# Patient Record
Sex: FEMALE | Race: WHITE | NOT HISPANIC OR LATINO | Employment: OTHER | URBAN - METROPOLITAN AREA
[De-identification: names, ages, dates, MRNs, and addresses within clinical notes are randomized per-mention and may not be internally consistent; named-entity substitution may affect disease eponyms.]

---

## 2019-01-27 ENCOUNTER — APPOINTMENT (OUTPATIENT)
Dept: RADIOLOGY | Facility: MEDICAL CENTER | Age: 64
DRG: 394 | End: 2019-01-27
Attending: EMERGENCY MEDICINE
Payer: COMMERCIAL

## 2019-01-27 ENCOUNTER — HOSPITAL ENCOUNTER (INPATIENT)
Facility: MEDICAL CENTER | Age: 64
LOS: 2 days | DRG: 394 | End: 2019-01-29
Attending: EMERGENCY MEDICINE | Admitting: HOSPITALIST
Payer: COMMERCIAL

## 2019-01-27 DIAGNOSIS — R58 RETROPERITONEAL BLEED: ICD-10-CM

## 2019-01-27 DIAGNOSIS — K68.3 RETROPERITONEAL HEMATOMA: ICD-10-CM

## 2019-01-27 LAB
ALBUMIN SERPL BCP-MCNC: 4.4 G/DL (ref 3.2–4.9)
ALBUMIN/GLOB SERPL: 1.5 G/DL
ALP SERPL-CCNC: 45 U/L (ref 30–99)
ALT SERPL-CCNC: 23 U/L (ref 2–50)
ANION GAP SERPL CALC-SCNC: 5 MMOL/L (ref 0–11.9)
APPEARANCE UR: CLEAR
APPEARANCE UR: CLEAR
APTT PPP: 28.6 SEC (ref 24.7–36)
AST SERPL-CCNC: 18 U/L (ref 12–45)
BASOPHILS # BLD AUTO: 0.7 % (ref 0–1.8)
BASOPHILS # BLD: 0.07 K/UL (ref 0–0.12)
BILIRUB SERPL-MCNC: 0.6 MG/DL (ref 0.1–1.5)
BILIRUB UR QL STRIP.AUTO: NEGATIVE
BUN SERPL-MCNC: 22 MG/DL (ref 8–22)
CALCIUM SERPL-MCNC: 9.2 MG/DL (ref 8.5–10.5)
CFT BLD TEG: 5.8 MIN (ref 5–10)
CHLORIDE SERPL-SCNC: 106 MMOL/L (ref 96–112)
CLOT ANGLE BLD TEG: 59.5 DEGREES (ref 53–72)
CLOT LYSIS 30M P MA LENFR BLD TEG: 1.5 % (ref 0–8)
CO2 SERPL-SCNC: 27 MMOL/L (ref 20–33)
COLOR UR AUTO: YELLOW
COLOR UR: YELLOW
CREAT SERPL-MCNC: 0.63 MG/DL (ref 0.5–1.4)
CT.EXTRINSIC BLD ROTEM: 2.3 MIN (ref 1–3)
EOSINOPHIL # BLD AUTO: 0.13 K/UL (ref 0–0.51)
EOSINOPHIL NFR BLD: 1.4 % (ref 0–6.9)
ERYTHROCYTE [DISTWIDTH] IN BLOOD BY AUTOMATED COUNT: 49.5 FL (ref 35.9–50)
GLOBULIN SER CALC-MCNC: 2.9 G/DL (ref 1.9–3.5)
GLUCOSE SERPL-MCNC: 104 MG/DL (ref 65–99)
GLUCOSE UR QL STRIP.AUTO: NEGATIVE MG/DL
GLUCOSE UR STRIP.AUTO-MCNC: NEGATIVE MG/DL
HCT VFR BLD AUTO: 43.6 % (ref 37–47)
HGB BLD-MCNC: 14.5 G/DL (ref 12–16)
IMM GRANULOCYTES # BLD AUTO: 0.05 K/UL (ref 0–0.11)
IMM GRANULOCYTES NFR BLD AUTO: 0.5 % (ref 0–0.9)
INR PPP: 1.06 (ref 0.87–1.13)
KETONES UR QL STRIP.AUTO: NEGATIVE MG/DL
KETONES UR STRIP.AUTO-MCNC: NEGATIVE MG/DL
LEUKOCYTE ESTERASE UR QL STRIP.AUTO: NEGATIVE
LEUKOCYTE ESTERASE UR QL STRIP.AUTO: NEGATIVE
LIPASE SERPL-CCNC: 22 U/L (ref 11–82)
LYMPHOCYTES # BLD AUTO: 2.27 K/UL (ref 1–4.8)
LYMPHOCYTES NFR BLD: 24 % (ref 22–41)
MCF BLD TEG: 61.7 MM (ref 50–70)
MCH RBC QN AUTO: 31.5 PG (ref 27–33)
MCHC RBC AUTO-ENTMCNC: 33.3 G/DL (ref 33.6–35)
MCV RBC AUTO: 94.8 FL (ref 81.4–97.8)
MICRO URNS: NORMAL
MONOCYTES # BLD AUTO: 0.61 K/UL (ref 0–0.85)
MONOCYTES NFR BLD AUTO: 6.4 % (ref 0–13.4)
NEUTROPHILS # BLD AUTO: 6.34 K/UL (ref 2–7.15)
NEUTROPHILS NFR BLD: 67 % (ref 44–72)
NITRITE UR QL STRIP.AUTO: NEGATIVE
NITRITE UR QL STRIP.AUTO: NEGATIVE
NRBC # BLD AUTO: 0 K/UL
NRBC BLD-RTO: 0 /100 WBC
PA AA BLD-ACNC: 28.4 %
PA ADP BLD-ACNC: 58.9 %
PH UR STRIP.AUTO: 5 [PH]
PH UR STRIP.AUTO: 5.5 [PH]
PLATELET # BLD AUTO: 127 K/UL (ref 164–446)
PMV BLD AUTO: 12.4 FL (ref 9–12.9)
POTASSIUM SERPL-SCNC: 4.1 MMOL/L (ref 3.6–5.5)
PROT SERPL-MCNC: 7.3 G/DL (ref 6–8.2)
PROT UR QL STRIP: NEGATIVE MG/DL
PROT UR QL STRIP: NEGATIVE MG/DL
PROTHROMBIN TIME: 13.9 SEC (ref 12–14.6)
RBC # BLD AUTO: 4.6 M/UL (ref 4.2–5.4)
RBC UR QL AUTO: NEGATIVE
RBC UR QL AUTO: NEGATIVE
SODIUM SERPL-SCNC: 138 MMOL/L (ref 135–145)
SP GR UR STRIP.AUTO: 1.03
SP GR UR: 1.02
TEG ALGORITHM TGALG: NORMAL
UROBILINOGEN UR STRIP.AUTO-MCNC: 0.2 MG/DL
WBC # BLD AUTO: 9.5 K/UL (ref 4.8–10.8)

## 2019-01-27 PROCEDURE — 700102 HCHG RX REV CODE 250 W/ 637 OVERRIDE(OP): Performed by: HOSPITALIST

## 2019-01-27 PROCEDURE — 700101 HCHG RX REV CODE 250: Performed by: HOSPITALIST

## 2019-01-27 PROCEDURE — 99153 MOD SED SAME PHYS/QHP EA: CPT

## 2019-01-27 PROCEDURE — 85384 FIBRINOGEN ACTIVITY: CPT

## 2019-01-27 PROCEDURE — 85347 COAGULATION TIME ACTIVATED: CPT

## 2019-01-27 PROCEDURE — B4141ZZ FLUOROSCOPY OF SUPERIOR MESENTERIC ARTERY USING LOW OSMOLAR CONTRAST: ICD-10-PCS | Performed by: RADIOLOGY

## 2019-01-27 PROCEDURE — B41B1ZZ FLUOROSCOPY OF OTHER INTRA-ABDOMINAL ARTERIES USING LOW OSMOLAR CONTRAST: ICD-10-PCS | Performed by: RADIOLOGY

## 2019-01-27 PROCEDURE — 700111 HCHG RX REV CODE 636 W/ 250 OVERRIDE (IP): Performed by: RADIOLOGY

## 2019-01-27 PROCEDURE — 99223 1ST HOSP IP/OBS HIGH 75: CPT | Performed by: HOSPITALIST

## 2019-01-27 PROCEDURE — 96374 THER/PROPH/DIAG INJ IV PUSH: CPT

## 2019-01-27 PROCEDURE — 94760 N-INVAS EAR/PLS OXIMETRY 1: CPT

## 2019-01-27 PROCEDURE — 75774 ARTERY X-RAY EACH VESSEL: CPT

## 2019-01-27 PROCEDURE — B41C1ZZ FLUOROSCOPY OF PELVIC ARTERIES USING LOW OSMOLAR CONTRAST: ICD-10-PCS | Performed by: RADIOLOGY

## 2019-01-27 PROCEDURE — 74177 CT ABD & PELVIS W/CONTRAST: CPT

## 2019-01-27 PROCEDURE — 700105 HCHG RX REV CODE 258: Performed by: HOSPITALIST

## 2019-01-27 PROCEDURE — B4191ZZ FLUOROSCOPY OF LUMBAR ARTERIES USING LOW OSMOLAR CONTRAST: ICD-10-PCS | Performed by: RADIOLOGY

## 2019-01-27 PROCEDURE — 700111 HCHG RX REV CODE 636 W/ 250 OVERRIDE (IP): Performed by: EMERGENCY MEDICINE

## 2019-01-27 PROCEDURE — 80053 COMPREHEN METABOLIC PANEL: CPT

## 2019-01-27 PROCEDURE — 85610 PROTHROMBIN TIME: CPT

## 2019-01-27 PROCEDURE — 770006 HCHG ROOM/CARE - MED/SURG/GYN SEMI*

## 2019-01-27 PROCEDURE — 81003 URINALYSIS AUTO W/O SCOPE: CPT

## 2019-01-27 PROCEDURE — B41F1ZZ FLUOROSCOPY OF RIGHT LOWER EXTREMITY ARTERIES USING LOW OSMOLAR CONTRAST: ICD-10-PCS | Performed by: RADIOLOGY

## 2019-01-27 PROCEDURE — 96376 TX/PRO/DX INJ SAME DRUG ADON: CPT

## 2019-01-27 PROCEDURE — 85025 COMPLETE CBC W/AUTO DIFF WBC: CPT

## 2019-01-27 PROCEDURE — 700117 HCHG RX CONTRAST REV CODE 255: Performed by: RADIOLOGY

## 2019-01-27 PROCEDURE — 75726 ARTERY X-RAYS ABDOMEN: CPT

## 2019-01-27 PROCEDURE — 700111 HCHG RX REV CODE 636 W/ 250 OVERRIDE (IP): Performed by: HOSPITALIST

## 2019-01-27 PROCEDURE — A9270 NON-COVERED ITEM OR SERVICE: HCPCS | Performed by: HOSPITALIST

## 2019-01-27 PROCEDURE — 81002 URINALYSIS NONAUTO W/O SCOPE: CPT

## 2019-01-27 PROCEDURE — 96375 TX/PRO/DX INJ NEW DRUG ADDON: CPT

## 2019-01-27 PROCEDURE — 85576 BLOOD PLATELET AGGREGATION: CPT | Mod: 91

## 2019-01-27 PROCEDURE — 99285 EMERGENCY DEPT VISIT HI MDM: CPT

## 2019-01-27 PROCEDURE — 700111 HCHG RX REV CODE 636 W/ 250 OVERRIDE (IP)

## 2019-01-27 PROCEDURE — B4101ZZ FLUOROSCOPY OF ABDOMINAL AORTA USING LOW OSMOLAR CONTRAST: ICD-10-PCS | Performed by: RADIOLOGY

## 2019-01-27 PROCEDURE — 85730 THROMBOPLASTIN TIME PARTIAL: CPT

## 2019-01-27 PROCEDURE — 83690 ASSAY OF LIPASE: CPT

## 2019-01-27 PROCEDURE — 700117 HCHG RX CONTRAST REV CODE 255: Performed by: EMERGENCY MEDICINE

## 2019-01-27 RX ORDER — AMOXICILLIN 250 MG
2 CAPSULE ORAL 2 TIMES DAILY
Status: DISCONTINUED | OUTPATIENT
Start: 2019-01-27 | End: 2019-01-29 | Stop reason: HOSPADM

## 2019-01-27 RX ORDER — ONDANSETRON 2 MG/ML
4 INJECTION INTRAMUSCULAR; INTRAVENOUS PRN
Status: DISCONTINUED | OUTPATIENT
Start: 2019-01-27 | End: 2019-01-27 | Stop reason: HOSPADM

## 2019-01-27 RX ORDER — ALBUTEROL SULFATE 90 UG/1
2 AEROSOL, METERED RESPIRATORY (INHALATION) EVERY 6 HOURS PRN
COMMUNITY

## 2019-01-27 RX ORDER — ACETAMINOPHEN 325 MG/1
650 TABLET ORAL EVERY 6 HOURS PRN
Status: DISCONTINUED | OUTPATIENT
Start: 2019-01-27 | End: 2019-01-29 | Stop reason: HOSPADM

## 2019-01-27 RX ORDER — POLYETHYLENE GLYCOL 3350 17 G/17G
1 POWDER, FOR SOLUTION ORAL
Status: DISCONTINUED | OUTPATIENT
Start: 2019-01-27 | End: 2019-01-29 | Stop reason: HOSPADM

## 2019-01-27 RX ORDER — SODIUM CHLORIDE 9 MG/ML
500 INJECTION, SOLUTION INTRAVENOUS
Status: DISCONTINUED | OUTPATIENT
Start: 2019-01-27 | End: 2019-01-27 | Stop reason: HOSPADM

## 2019-01-27 RX ORDER — ALBUTEROL SULFATE 90 UG/1
2 AEROSOL, METERED RESPIRATORY (INHALATION)
Status: DISCONTINUED | OUTPATIENT
Start: 2019-01-27 | End: 2019-01-29 | Stop reason: HOSPADM

## 2019-01-27 RX ORDER — MORPHINE SULFATE 4 MG/ML
4 INJECTION, SOLUTION INTRAMUSCULAR; INTRAVENOUS ONCE
Status: COMPLETED | OUTPATIENT
Start: 2019-01-27 | End: 2019-01-27

## 2019-01-27 RX ORDER — ONDANSETRON 2 MG/ML
4 INJECTION INTRAMUSCULAR; INTRAVENOUS EVERY 4 HOURS PRN
Status: DISCONTINUED | OUTPATIENT
Start: 2019-01-27 | End: 2019-01-29 | Stop reason: HOSPADM

## 2019-01-27 RX ORDER — ONDANSETRON 4 MG/1
4 TABLET, ORALLY DISINTEGRATING ORAL EVERY 4 HOURS PRN
Status: DISCONTINUED | OUTPATIENT
Start: 2019-01-27 | End: 2019-01-29 | Stop reason: HOSPADM

## 2019-01-27 RX ORDER — OXYCODONE HYDROCHLORIDE 5 MG/1
5 TABLET ORAL
Status: DISCONTINUED | OUTPATIENT
Start: 2019-01-27 | End: 2019-01-29 | Stop reason: HOSPADM

## 2019-01-27 RX ORDER — SODIUM CHLORIDE, SODIUM LACTATE, POTASSIUM CHLORIDE, CALCIUM CHLORIDE 600; 310; 30; 20 MG/100ML; MG/100ML; MG/100ML; MG/100ML
INJECTION, SOLUTION INTRAVENOUS CONTINUOUS
Status: DISCONTINUED | OUTPATIENT
Start: 2019-01-27 | End: 2019-01-28

## 2019-01-27 RX ORDER — MORPHINE SULFATE 4 MG/ML
2 INJECTION, SOLUTION INTRAMUSCULAR; INTRAVENOUS
Status: DISCONTINUED | OUTPATIENT
Start: 2019-01-27 | End: 2019-01-29 | Stop reason: HOSPADM

## 2019-01-27 RX ORDER — MIDAZOLAM HYDROCHLORIDE 1 MG/ML
.5-2 INJECTION INTRAMUSCULAR; INTRAVENOUS PRN
Status: DISCONTINUED | OUTPATIENT
Start: 2019-01-27 | End: 2019-01-27 | Stop reason: HOSPADM

## 2019-01-27 RX ORDER — MIDAZOLAM HYDROCHLORIDE 1 MG/ML
INJECTION INTRAMUSCULAR; INTRAVENOUS
Status: COMPLETED
Start: 2019-01-27 | End: 2019-01-27

## 2019-01-27 RX ORDER — LIDOCAINE 50 MG/G
1 PATCH TOPICAL EVERY 24 HOURS
Status: DISCONTINUED | OUTPATIENT
Start: 2019-01-27 | End: 2019-01-29 | Stop reason: HOSPADM

## 2019-01-27 RX ORDER — BUDESONIDE AND FORMOTEROL FUMARATE DIHYDRATE 160; 4.5 UG/1; UG/1
2 AEROSOL RESPIRATORY (INHALATION)
Status: DISCONTINUED | OUTPATIENT
Start: 2019-01-27 | End: 2019-01-29 | Stop reason: HOSPADM

## 2019-01-27 RX ORDER — PROMETHAZINE HYDROCHLORIDE 25 MG/1
12.5-25 TABLET ORAL EVERY 4 HOURS PRN
Status: DISCONTINUED | OUTPATIENT
Start: 2019-01-27 | End: 2019-01-29 | Stop reason: HOSPADM

## 2019-01-27 RX ORDER — PROMETHAZINE HYDROCHLORIDE 12.5 MG/1
12.5-25 SUPPOSITORY RECTAL EVERY 4 HOURS PRN
Status: DISCONTINUED | OUTPATIENT
Start: 2019-01-27 | End: 2019-01-29 | Stop reason: HOSPADM

## 2019-01-27 RX ORDER — BISACODYL 10 MG
10 SUPPOSITORY, RECTAL RECTAL
Status: DISCONTINUED | OUTPATIENT
Start: 2019-01-27 | End: 2019-01-29 | Stop reason: HOSPADM

## 2019-01-27 RX ORDER — GUAIFENESIN/DEXTROMETHORPHAN 100-10MG/5
10 SYRUP ORAL EVERY 6 HOURS PRN
Status: DISCONTINUED | OUTPATIENT
Start: 2019-01-27 | End: 2019-01-29 | Stop reason: HOSPADM

## 2019-01-27 RX ORDER — ONDANSETRON 2 MG/ML
4 INJECTION INTRAMUSCULAR; INTRAVENOUS ONCE
Status: COMPLETED | OUTPATIENT
Start: 2019-01-27 | End: 2019-01-27

## 2019-01-27 RX ORDER — OXYCODONE HYDROCHLORIDE 5 MG/1
2.5 TABLET ORAL
Status: DISCONTINUED | OUTPATIENT
Start: 2019-01-27 | End: 2019-01-29 | Stop reason: HOSPADM

## 2019-01-27 RX ADMIN — FENTANYL CITRATE 50 MCG: 50 INJECTION, SOLUTION INTRAMUSCULAR; INTRAVENOUS at 15:51

## 2019-01-27 RX ADMIN — ALBUTEROL SULFATE 2 PUFF: 90 AEROSOL, METERED RESPIRATORY (INHALATION) at 20:10

## 2019-01-27 RX ADMIN — IOHEXOL 172 ML: 300 INJECTION, SOLUTION INTRAVENOUS at 16:29

## 2019-01-27 RX ADMIN — SENNOSIDES AND DOCUSATE SODIUM 2 TABLET: 8.6; 5 TABLET ORAL at 17:46

## 2019-01-27 RX ADMIN — MORPHINE SULFATE 4 MG: 4 INJECTION INTRAVENOUS at 09:20

## 2019-01-27 RX ADMIN — MORPHINE SULFATE 4 MG: 4 INJECTION INTRAVENOUS at 13:04

## 2019-01-27 RX ADMIN — ONDANSETRON 4 MG: 2 INJECTION INTRAMUSCULAR; INTRAVENOUS at 09:21

## 2019-01-27 RX ADMIN — BUDESONIDE AND FORMOTEROL FUMARATE DIHYDRATE 2 PUFF: 160; 4.5 AEROSOL RESPIRATORY (INHALATION) at 23:47

## 2019-01-27 RX ADMIN — MIDAZOLAM 1 MG: 1 INJECTION INTRAMUSCULAR; INTRAVENOUS at 15:51

## 2019-01-27 RX ADMIN — FENTANYL CITRATE 50 MCG: 50 INJECTION, SOLUTION INTRAMUSCULAR; INTRAVENOUS at 15:54

## 2019-01-27 RX ADMIN — LIDOCAINE 1 PATCH: 50 PATCH TOPICAL at 17:46

## 2019-01-27 RX ADMIN — MORPHINE SULFATE 2 MG: 4 INJECTION INTRAVENOUS at 20:22

## 2019-01-27 RX ADMIN — SODIUM CHLORIDE, POTASSIUM CHLORIDE, SODIUM LACTATE AND CALCIUM CHLORIDE: 600; 310; 30; 20 INJECTION, SOLUTION INTRAVENOUS at 17:47

## 2019-01-27 RX ADMIN — MIDAZOLAM 1 MG: 1 INJECTION INTRAMUSCULAR; INTRAVENOUS at 15:54

## 2019-01-27 RX ADMIN — IOHEXOL 100 ML: 350 INJECTION, SOLUTION INTRAVENOUS at 12:45

## 2019-01-27 ASSESSMENT — COGNITIVE AND FUNCTIONAL STATUS - GENERAL
MOBILITY SCORE: 24
SUGGESTED CMS G CODE MODIFIER DAILY ACTIVITY: CH
DAILY ACTIVITIY SCORE: 24
SUGGESTED CMS G CODE MODIFIER MOBILITY: CH

## 2019-01-27 ASSESSMENT — LIFESTYLE VARIABLES
TOTAL SCORE: 0
HAVE YOU EVER FELT YOU SHOULD CUT DOWN ON YOUR DRINKING: NO
ALCOHOL_USE: YES
AVERAGE NUMBER OF DAYS PER WEEK YOU HAVE A DRINK CONTAINING ALCOHOL: 1
TOTAL SCORE: 0
EVER HAD A DRINK FIRST THING IN THE MORNING TO STEADY YOUR NERVES TO GET RID OF A HANGOVER: NO
EVER_SMOKED: NEVER
EVER FELT BAD OR GUILTY ABOUT YOUR DRINKING: NO
CONSUMPTION TOTAL: NEGATIVE
HOW MANY TIMES IN THE PAST YEAR HAVE YOU HAD 5 OR MORE DRINKS IN A DAY: 0
ON A TYPICAL DAY WHEN YOU DRINK ALCOHOL HOW MANY DRINKS DO YOU HAVE: 5
HAVE PEOPLE ANNOYED YOU BY CRITICIZING YOUR DRINKING: NO
TOTAL SCORE: 0

## 2019-01-27 ASSESSMENT — PATIENT HEALTH QUESTIONNAIRE - PHQ9
1. LITTLE INTEREST OR PLEASURE IN DOING THINGS: NOT AT ALL
2. FEELING DOWN, DEPRESSED, IRRITABLE, OR HOPELESS: NOT AT ALL
SUM OF ALL RESPONSES TO PHQ9 QUESTIONS 1 AND 2: 0

## 2019-01-27 NOTE — ED NOTES
..Pt updated on poc. Provided another warm blanket. Pt has call light in hand and verbalizes understanding of use. Pt nad. Will continue to monitor

## 2019-01-27 NOTE — ED NOTES
.Pt updated on poc. Expresses no needs at this time. Pt has call light in hand and verbalizes understanding of use. Pt nad. Will continue to monitor

## 2019-01-27 NOTE — ED TRIAGE NOTES
Chief Complaint   Patient presents with   • RUQ Pain   • RLQ Pain   Pt to triage in NAD.  Pt reports she is up here from [place], [place] to do some shopping.  Pt denies N/V/D, denies fever/chills.  Pt reports hx of appendectomy.  Pt educated on triage process and instructed to notify triage RN of any change in status.

## 2019-01-27 NOTE — ED NOTES
Med rec complete per pt at bedside   ROSE  Pt finished a ZPak yesterday AM and a Medrol Dosepak this AM for bronchitis

## 2019-01-27 NOTE — H&P
History and Physical    Date: 1/27/2019    PCP: No primary care provider on file.      CC: ABDOMINAL PAIN    HPI: This is a 63 y.o. female who is presenting WITH RETROPERITONEAL HEMORRHAGE SEEN ON CT. POSSIBLE ACTIVE BLEED.    Past Medical History:   Diagnosis Date   • Asthma        Past Surgical History:   Procedure Laterality Date   • APPENDECTOMY     • LUMBAR FUSION ANTERIOR         No current facility-administered medications for this encounter.      Current Outpatient Prescriptions   Medication Sig Dispense Refill   • fluticasone-salmeterol (ADVAIR) 250-50 MCG/DOSE AEROSOL POWDER, BREATH ACTIVATED Inhale 1 Puff by mouth every 12 hours.     • albuterol 108 (90 Base) MCG/ACT Aero Soln inhalation aerosol Inhale 2 Puffs by mouth every 6 hours as needed for Shortness of Breath.          Social History     Social History   • Marital status:      Spouse name: N/A   • Number of children: N/A   • Years of education: N/A     Occupational History   • Not on file.     Social History Main Topics   • Smoking status: Never Smoker   • Smokeless tobacco: Never Used   • Alcohol use Yes      Comment: 1-2 glasses of wine per day    • Drug use: No   • Sexual activity: Not on file     Other Topics Concern   • Not on file     Social History Narrative   • No narrative on file       History reviewed. No pertinent family history.    Allergies:  Asa [aspirin]    Review of Systems:  Negative except for ABDOMINAL PAIN EXACERBATED BY COUGH.     Physical Exam   Constitutional: She appears well-developed.   HENT:   Head: Normocephalic.   Cardiovascular: Normal rate, regular rhythm and normal heart sounds.    Pulmonary/Chest: Effort normal and breath sounds normal.   Abdominal: There is tenderness.   Neurological: She is alert.   Skin: Skin is warm.   Psychiatric: She has a normal mood and affect. Her behavior is normal.       Vital Signs  Blood Pressure: 117/89   Temperature: 36.2 °C (97.2 °F)   Pulse: 85   Respiration: 18   Pulse  "Oximetry: (!) 87 %        Labs:  Recent Labs      01/27/19   0858   WBC  9.5   RBC  4.60   HEMOGLOBIN  14.5   HEMATOCRIT  43.6   MCV  94.8   MCH  31.5   MCHC  33.3*   RDW  49.5   PLATELETCT  127*   MPV  12.4     Recent Labs      01/27/19   0858   SODIUM  138   POTASSIUM  4.1   CHLORIDE  106   CO2  27   GLUCOSE  104*   BUN  22   CREATININE  0.63   CALCIUM  9.2     Recent Labs      01/27/19   1310   APTT  28.6   INR  1.06     Recent Labs      01/27/19   0858  01/27/19   1310   ASTSGOT  18   --    ALTSGPT  23   --    TBILIRUBIN  0.6   --    ALKPHOSPHAT  45   --    GLOBULIN  2.9   --    INR   --   1.06       Radiology:  CT-ABDOMEN-PELVIS WITH   Final Result      Ill-defined soft tissue density in the right lower quadrant retroperitoneal space likely representing hematoma versus mass. There is some curvilinear increased density centrally within the structure likely representing a vessel with blush/active    extravasation. A mass with calcification centrally is also a possibility.      Two right middle lobe pulmonary nodules measuring up to 5 mm are nonspecific and may be postinflammatory or postinfectious though potentially could represent metastatic disease if this truly is a retroperitoneal mass in the right lower quadrant.      This was discussed with Dr. Valdivia at 12:40 PM.      IR-VISCERAL ANGIOGRAM - SELECTIVE    (Results Pending)             Assessment and Plan:This is a 63 y.o. WITH \"SPONTANEOUS\" RIGHT SIDED RETROPERITONEAL HEMORRHAGE HERE FOR ANGIOGRAPHY AND POSSIBLE EMBOLIZATION.                 "

## 2019-01-27 NOTE — ED PROVIDER NOTES
ED Provider Note    Scribed for Rojas Valdivia M.D. by Lianne Garcia. 1/27/2019  8:59 AM    Means of arrival: walk-in  History limited by: none    CHIEF COMPLAINT  Chief Complaint   Patient presents with   • RUQ Pain   • RLQ Pain       HPI  Kaylan Beverly is a 63 y.o. female who presents to the ED complaining of right sided abdominal pain that has been progressively worsening since yesterday evening, 11PM. Patient reports that this pain progressed to the point, that she has been unable to find a comfortable position and is beginning to radiate into her right hip. The pain is constant, exacerbated with cough, or any type of movement. Patient just recently finished a steroid and azithromycin regimen for treatment of bronchitis, with no lasting symptoms from that. Last bowel movement was this morning, and was somewhat soft otherwise normal. No complaints of fevers, chills, vision changes, sore throat, chest pain, cough, shortness of breath, nausea, vomiting, diarrhea, rash, unilateral weakness, numbness, urinary symptoms. Abdominal surgeries include partial hysterectomy, and appendectomy.    REVIEW OF SYSTEMS    CONSTITUTIONAL:  Denies fever, chills  EYES:  Denies vision changes  ENT:  Denies sore throat, nose, or ear pain.  CARDIOVASCULAR:  Denies chest pain  RESPIRATORY:  Denies cough, shortness of breath  GI: positive abdominal pain Denies nausea, vomiting, or diarrhea.  MUSCULOSKELETAL:  Denies weakness  SKIN:  No rash  NEUROLOGIC:  Denies focal weakness, or numbness.  : No complaints of painful urination, hematuria.    PAST MEDICAL HISTORY  Past Medical History:   Diagnosis Date   • Asthma        FAMILY HISTORY  History reviewed. No pertinent family history.    SOCIAL HISTORY   reports that she has never smoked. She has never used smokeless tobacco. She reports that she drinks alcohol. She reports that she does not use drugs.    SURGICAL HISTORY  Past Surgical History:   Procedure Laterality Date   •  "APPENDECTOMY     • LUMBAR FUSION ANTERIOR         CURRENT MEDICATIONS  No current facility-administered medications for this encounter.   No current outpatient prescriptions on file.    ALLERGIES  Allergies   Allergen Reactions   • Asa [Aspirin] Hives       PHYSICAL EXAM  VITAL SIGNS: /89   Pulse 90   Temp 36.2 °C (97.2 °F) (Temporal)   Resp 18   Ht 1.626 m (5' 4\")   Wt 88 kg (194 lb 0.1 oz)   SpO2 95%   BMI 33.30 kg/m²      Constitutional: Patient is awake and alert. No acute respiratory distress. Mild painful distress Well developed, Well nourished, Non-toxic appearance.  HENT: Normocephalic, Atraumatic, Bilateral external ears normal, Oropharynx pink moist with no exudates, Nose patent.  Eyes: Sclera and conjunctiva clear, No discharge.   Neck:  Supple no nuchal rigidity, no thyromegaly or mass. Non-tender  Lymphatic: No supraclavicular  lymph nodes.   Cardiovascular: Heart is regular rate and rhythm no murmur, rub or thrill.   Thorax & Lungs: Chest is symmetrical, with good breath sounds. No wheezing or crackles. No respiratory distress, No chest tenderness.   Abdomen: Soft, exquisite tenderness along right lower and right mid abdominal pain. no hepatosplenomegaly there is no guarding or rebound, No masses, No pulsatile masses.   Skin: Warm, Dry, no petechia, purpura, or rash.  No bruising to the right flank or abdomen  Back: Non tender with palpation, No CVA tenderness.   Extremities: No edema. Non tender.   Musculoskeletal: Good range of motion to upper and lower extremities.  Neurologic: Alert & oriented to person, time, and place.  Psychiatric: Normal affect    LABS  Results for orders placed or performed during the hospital encounter of 01/27/19   CBC WITH DIFFERENTIAL   Result Value Ref Range    WBC 9.5 4.8 - 10.8 K/uL    RBC 4.60 4.20 - 5.40 M/uL    Hemoglobin 14.5 12.0 - 16.0 g/dL    Hematocrit 43.6 37.0 - 47.0 %    MCV 94.8 81.4 - 97.8 fL    MCH 31.5 27.0 - 33.0 pg    MCHC 33.3 (L) 33.6 - " 35.0 g/dL    RDW 49.5 35.9 - 50.0 fL    Platelet Count 127 (L) 164 - 446 K/uL    MPV 12.4 9.0 - 12.9 fL    Neutrophils-Polys 67.00 44.00 - 72.00 %    Lymphocytes 24.00 22.00 - 41.00 %    Monocytes 6.40 0.00 - 13.40 %    Eosinophils 1.40 0.00 - 6.90 %    Basophils 0.70 0.00 - 1.80 %    Immature Granulocytes 0.50 0.00 - 0.90 %    Nucleated RBC 0.00 /100 WBC    Neutrophils (Absolute) 6.34 2.00 - 7.15 K/uL    Lymphs (Absolute) 2.27 1.00 - 4.80 K/uL    Monos (Absolute) 0.61 0.00 - 0.85 K/uL    Eos (Absolute) 0.13 0.00 - 0.51 K/uL    Baso (Absolute) 0.07 0.00 - 0.12 K/uL    Immature Granulocytes (abs) 0.05 0.00 - 0.11 K/uL    NRBC (Absolute) 0.00 K/uL   COMP METABOLIC PANEL   Result Value Ref Range    Sodium 138 135 - 145 mmol/L    Potassium 4.1 3.6 - 5.5 mmol/L    Chloride 106 96 - 112 mmol/L    Co2 27 20 - 33 mmol/L    Anion Gap 5.0 0.0 - 11.9    Glucose 104 (H) 65 - 99 mg/dL    Bun 22 8 - 22 mg/dL    Creatinine 0.63 0.50 - 1.40 mg/dL    Calcium 9.2 8.5 - 10.5 mg/dL    AST(SGOT) 18 12 - 45 U/L    ALT(SGPT) 23 2 - 50 U/L    Alkaline Phosphatase 45 30 - 99 U/L    Total Bilirubin 0.6 0.1 - 1.5 mg/dL    Albumin 4.4 3.2 - 4.9 g/dL    Total Protein 7.3 6.0 - 8.2 g/dL    Globulin 2.9 1.9 - 3.5 g/dL    A-G Ratio 1.5 g/dL   LIPASE   Result Value Ref Range    Lipase 22 11 - 82 U/L   URINALYSIS CULTURE, IF INDICATED   Result Value Ref Range    Color Yellow     Character Clear     Specific Gravity 1.026 <1.035    Ph 5.0 5.0 - 8.0    Glucose Negative Negative mg/dL    Ketones Negative Negative mg/dL    Protein Negative Negative mg/dL    Bilirubin Negative Negative    Urobilinogen, Urine 0.2 Negative    Nitrite Negative Negative    Leukocyte Esterase Negative Negative    Occult Blood Negative Negative    Micro Urine Req see below    ESTIMATED GFR   Result Value Ref Range    GFR If African American >60 >60 mL/min/1.73 m 2    GFR If Non African American >60 >60 mL/min/1.73 m 2   Prothrombin Time   Result Value Ref Range    PT 13.9 12.0  - 14.6 sec    INR 1.06 0.87 - 1.13   PTT   Result Value Ref Range    APTT 28.6 24.7 - 36.0 sec   POC UA   Result Value Ref Range    POC Color Yellow     POC Appearance Clear     POC Glucose Negative Negative mg/dL    POC Ketones Negative Negative mg/dL    POC Specific Gravity 1.025 1.005 - 1.030    POC Blood Negative Negative    POC Urine PH 5.5 5.0 - 8.0    POC Protein Negative Negative mg/dL    POC Nitrites Negative Negative    POC Leukocyte Esterase Negative Negative       All labs reviewed by me.    RADIOLOGY/PROCEDURES  CT-ABDOMEN-PELVIS WITH   Final Result      Ill-defined soft tissue density in the right lower quadrant retroperitoneal space likely representing hematoma versus mass. There is some curvilinear increased density centrally within the structure likely representing a vessel with blush/active    extravasation. A mass with calcification centrally is also a possibility.      Two right middle lobe pulmonary nodules measuring up to 5 mm are nonspecific and may be postinflammatory or postinfectious though potentially could represent metastatic disease if this truly is a retroperitoneal mass in the right lower quadrant.      This was discussed with Dr. Valdivia at 12:40 PM.      IR-VISCERAL ANGIOGRAM - SELECTIVE    (Results Pending)     The radiologist's interpretations of all radiological studies have been reviewed by me.     COURSE & MEDICAL DECISION MAKING  Pertinent Labs & Imaging studies reviewed. (See chart for details)    Differential diagnoses include but are not limited to: diverticulitis, kidney stone, UTI, pyelonephritis, gall bladder disease, diverticulitis, bowel obstruction, introsusception, renal colic.    8:59 AM - Patient seen and examined at bedside. She presents in mild painful distress with normal vital signs for evaluation of worsening severe right sided abdominal pain onset yesterday evening. Ordered abdomen pelvis CT, CBC, CMP, lipase, UA.  Patient will be medicated with 4mg IV Morphine  and 4mg IV Zofran for her symptoms. I informed the patient that I would evaluate for any acute origins of her symptoms with lab work and imaging and manage her discomfort here in the ED. She understands and agrees with treatment plan.    10:04 AM I re-evaluated patient at bedside. She is still experiencing the abdominal pain, with some mild improvement with medications. Urine and blood work at this time is negative. We are still waiting for imaging results.    12:40 PM radiology calls me acutely to inform me of patient's acute imaging findings. I informed the patient that there is evidence of abnormal bleeding along her area of discomfort with no obvious origin. She denies any recent falls or injuries, however, she has been experiencing significant coughing with her recent bronchitis, otherwise no acute traumas. I explained that I would need to consult with surgery about next appropriate transfer plan. [facility]. Last PO consumption was water and last dose of steroids this morning.    1:01 PM Spoke with Dr. Anthony, General Surgery, about the patient's condition. Advises admission to medicine. Will follow. [facility]ist and IR. Coags ordered    1:29 PM Spoke with Dr. Hernandez, Hospitalist, about the patient's condition. Agrees to admit the patient. Dr. Sullivan, IR, will take the patient to the IR suite for angiogram.    2:06 PM TEG platelet mapping ordered.    Decision Making  Patient is just getting over an upper respiratory tract infection with coughing.  Had a coughing spell last night and felt pain in the right groin area.  Here in the emergency department differential was broad including diverticulitis recurrent appendicitis bowel obstruction gallbladder disease pyelonephritis urinary tract infections although not limited to this.  CAT scan with IV contrast shows that she has a retroperitoneal hematoma with a blush.  The patient has been sent to interventional radiology to see if they can further  delineate this or even stop the bleeding and make sure that we do not see any other abnormalities there.  I discussed the case with surgery and not felt that the patient was to be admitted by medicine with IR evaluation.  Patient will be admitted under the care of the [facility]ist with interventional radiology evaluation.    DISPOSITION:  Patient will be admitted to Dr. Hernandez in guarded condition.    FINAL IMPRESSION  1. Retroperitoneal hematoma    2.  Abdominal pain    PLAN  1.  Hospitalists  2.  Continue workup and evaluation of her retroperitoneal hemorrhage and to the initial radiology suit     Lianne PUCKETT (Scribe), am scribing for, and in the presence of, Rojas Valdivia M.D..    Electronically signed by: Lianne Garcia (Nicoleibe), 1/27/2019    Rojas PUCKETT M.D. personally performed the services described in this documentation, as scribed by Lianne Garcia in my presence, and it is both accurate and complete.    The note accurately reflects work and decisions made by me.  Rojas Valdivia  1/27/2019  3:49 PM

## 2019-01-28 PROBLEM — R58 RETROPERITONEAL BLEED: Status: ACTIVE | Noted: 2019-01-28

## 2019-01-28 PROBLEM — D62 ACUTE BLOOD LOSS ANEMIA: Status: ACTIVE | Noted: 2019-01-28

## 2019-01-28 PROBLEM — J45.909 ASTHMA: Status: ACTIVE | Noted: 2019-01-28

## 2019-01-28 LAB
ALBUMIN SERPL BCP-MCNC: 3.3 G/DL (ref 3.2–4.9)
ALBUMIN/GLOB SERPL: 1.4 G/DL
ALP SERPL-CCNC: 38 U/L (ref 30–99)
ALT SERPL-CCNC: 16 U/L (ref 2–50)
ANION GAP SERPL CALC-SCNC: 7 MMOL/L (ref 0–11.9)
AST SERPL-CCNC: 14 U/L (ref 12–45)
BASOPHILS # BLD AUTO: 0.5 % (ref 0–1.8)
BASOPHILS # BLD: 0.05 K/UL (ref 0–0.12)
BILIRUB SERPL-MCNC: 0.6 MG/DL (ref 0.1–1.5)
BUN SERPL-MCNC: 18 MG/DL (ref 8–22)
CALCIUM SERPL-MCNC: 8.3 MG/DL (ref 8.5–10.5)
CHLORIDE SERPL-SCNC: 106 MMOL/L (ref 96–112)
CO2 SERPL-SCNC: 25 MMOL/L (ref 20–33)
CREAT SERPL-MCNC: 0.6 MG/DL (ref 0.5–1.4)
EOSINOPHIL # BLD AUTO: 0.18 K/UL (ref 0–0.51)
EOSINOPHIL NFR BLD: 2 % (ref 0–6.9)
ERYTHROCYTE [DISTWIDTH] IN BLOOD BY AUTOMATED COUNT: 50.4 FL (ref 35.9–50)
GLOBULIN SER CALC-MCNC: 2.4 G/DL (ref 1.9–3.5)
GLUCOSE SERPL-MCNC: 103 MG/DL (ref 65–99)
HCT VFR BLD AUTO: 36 % (ref 37–47)
HGB BLD-MCNC: 11.8 G/DL (ref 12–16)
IMM GRANULOCYTES # BLD AUTO: 0.04 K/UL (ref 0–0.11)
IMM GRANULOCYTES NFR BLD AUTO: 0.4 % (ref 0–0.9)
LYMPHOCYTES # BLD AUTO: 1.79 K/UL (ref 1–4.8)
LYMPHOCYTES NFR BLD: 19.5 % (ref 22–41)
MCH RBC QN AUTO: 31.5 PG (ref 27–33)
MCHC RBC AUTO-ENTMCNC: 32.8 G/DL (ref 33.6–35)
MCV RBC AUTO: 96 FL (ref 81.4–97.8)
MONOCYTES # BLD AUTO: 0.73 K/UL (ref 0–0.85)
MONOCYTES NFR BLD AUTO: 8 % (ref 0–13.4)
NEUTROPHILS # BLD AUTO: 6.39 K/UL (ref 2–7.15)
NEUTROPHILS NFR BLD: 69.6 % (ref 44–72)
NRBC # BLD AUTO: 0 K/UL
NRBC BLD-RTO: 0 /100 WBC
PLATELET # BLD AUTO: 110 K/UL (ref 164–446)
PMV BLD AUTO: 12.2 FL (ref 9–12.9)
POTASSIUM SERPL-SCNC: 3.9 MMOL/L (ref 3.6–5.5)
PROT SERPL-MCNC: 5.7 G/DL (ref 6–8.2)
RBC # BLD AUTO: 3.75 M/UL (ref 4.2–5.4)
SODIUM SERPL-SCNC: 138 MMOL/L (ref 135–145)
WBC # BLD AUTO: 9.2 K/UL (ref 4.8–10.8)

## 2019-01-28 PROCEDURE — 700101 HCHG RX REV CODE 250: Performed by: HOSPITALIST

## 2019-01-28 PROCEDURE — 700102 HCHG RX REV CODE 250 W/ 637 OVERRIDE(OP): Performed by: HOSPITALIST

## 2019-01-28 PROCEDURE — 770006 HCHG ROOM/CARE - MED/SURG/GYN SEMI*

## 2019-01-28 PROCEDURE — 80053 COMPREHEN METABOLIC PANEL: CPT

## 2019-01-28 PROCEDURE — 85025 COMPLETE CBC W/AUTO DIFF WBC: CPT

## 2019-01-28 PROCEDURE — A9270 NON-COVERED ITEM OR SERVICE: HCPCS | Performed by: HOSPITALIST

## 2019-01-28 PROCEDURE — 36415 COLL VENOUS BLD VENIPUNCTURE: CPT

## 2019-01-28 PROCEDURE — 99232 SBSQ HOSP IP/OBS MODERATE 35: CPT | Performed by: HOSPITALIST

## 2019-01-28 PROCEDURE — 700105 HCHG RX REV CODE 258: Performed by: HOSPITALIST

## 2019-01-28 RX ADMIN — OXYCODONE HYDROCHLORIDE 2.5 MG: 5 TABLET ORAL at 17:27

## 2019-01-28 RX ADMIN — ALBUTEROL SULFATE 2 PUFF: 90 AEROSOL, METERED RESPIRATORY (INHALATION) at 02:30

## 2019-01-28 RX ADMIN — GUAIFENESIN AND DEXTROMETHORPHAN 10 ML: 100; 10 SYRUP ORAL at 19:55

## 2019-01-28 RX ADMIN — SENNOSIDES AND DOCUSATE SODIUM 2 TABLET: 8.6; 5 TABLET ORAL at 17:23

## 2019-01-28 RX ADMIN — LIDOCAINE 1 PATCH: 50 PATCH TOPICAL at 17:22

## 2019-01-28 RX ADMIN — ACETAMINOPHEN 650 MG: 325 TABLET, FILM COATED ORAL at 06:04

## 2019-01-28 RX ADMIN — SENNOSIDES AND DOCUSATE SODIUM 2 TABLET: 8.6; 5 TABLET ORAL at 06:00

## 2019-01-28 RX ADMIN — ACETAMINOPHEN 650 MG: 325 TABLET, FILM COATED ORAL at 15:43

## 2019-01-28 RX ADMIN — SODIUM CHLORIDE, POTASSIUM CHLORIDE, SODIUM LACTATE AND CALCIUM CHLORIDE: 600; 310; 30; 20 INJECTION, SOLUTION INTRAVENOUS at 06:01

## 2019-01-28 RX ADMIN — BUDESONIDE AND FORMOTEROL FUMARATE DIHYDRATE 2 PUFF: 160; 4.5 AEROSOL RESPIRATORY (INHALATION) at 19:55

## 2019-01-28 RX ADMIN — BUDESONIDE AND FORMOTEROL FUMARATE DIHYDRATE 2 PUFF: 160; 4.5 AEROSOL RESPIRATORY (INHALATION) at 07:33

## 2019-01-28 RX ADMIN — GUAIFENESIN AND DEXTROMETHORPHAN 10 ML: 100; 10 SYRUP ORAL at 06:08

## 2019-01-28 ASSESSMENT — ENCOUNTER SYMPTOMS
CONSTITUTIONAL NEGATIVE: 1
FEVER: 0
HEADACHES: 0
FOCAL WEAKNESS: 0
MYALGIAS: 0
PSYCHIATRIC NEGATIVE: 1
DIZZINESS: 0
PALPITATIONS: 0
RESPIRATORY NEGATIVE: 1
SORE THROAT: 0
DEPRESSION: 0
BLURRED VISION: 0
WEIGHT LOSS: 0
COUGH: 0
VOMITING: 0
CARDIOVASCULAR NEGATIVE: 1
EYES NEGATIVE: 1
ABDOMINAL PAIN: 1
CHILLS: 0
NAUSEA: 0
NEUROLOGICAL NEGATIVE: 1
BRUISES/BLEEDS EASILY: 0
MUSCULOSKELETAL NEGATIVE: 1
DIAPHORESIS: 0
WEAKNESS: 0
SHORTNESS OF BREATH: 0

## 2019-01-28 ASSESSMENT — LIFESTYLE VARIABLES: SUBSTANCE_ABUSE: 0

## 2019-01-28 NOTE — H&P
DATE OF ADMISSION:  01/27/2019    DATE OF SERVICE:  01/27/2019    IDENTIFICATION:  This is a 63-year-old female from [place], [place].    PRIMARY CARE PHYSICIAN:  PAULINE Salazar    CHIEF COMPLAINT:  Right-sided flank and abdominal pain.    HISTORY OF PRESENT ILLNESS:  This is a 63-year-old female with a history of   asthma.  She recently was treated for bronchitis and asthma exacerbation with   some prednisone as well as antibiotics.  The patient has had significant   coughing, but was overall getting better.  She had coughing to the point where   she would have some dry heaving.  She noticed some right groin and flank pain   earlier this week, but this has been progressively worsening since yesterday.    The patient was [place] the way to [email or web address] by car to go shopping here and they   stayed at a casino overnight where since last night was unable to find a   comfortable position and the pain was starting to radiate into her right hip.    The pain is constant, getting worse with cough or any type of movement.  The   patient presents with these complaints to the emergency room and by CT   scanning was found to have a right retroperitoneal hematoma/questionable mass.    The patient has had no prior difficulties with abdominal pain or other   difficulties.  She is not on blood thinners.  She has had no family history of   bleeding disorders.  The patient other than her asthma has had no major   medical conditions.  The patient will be admitted for further treatment.  She   was referred to the surgical service, Dr. Anthony who feels that the patient   does not have a current surgical intervention need and to be referred to   medical service for admission.  The patient is here with her  who gives   some additional history.    ALLERGIES:  TO [facility] WHERE SHE DEVELOPS HIVES.    PRESCRIPTION MEDICATIONS:  Advair as well as albuterol inhaled for her asthma   condition.    PAST MEDICAL HISTORY:  1.  Asthma.  2.  Recent  bronchitis.    PAST SURGICAL HISTORY:  1.  History of hysterectomy.  2.  History of appendectomy.  3.  History of lumbar fusion.  4.  History of ectopic pregnancy.  5.  History of right knee surgery.    SOCIAL HISTORY:  Patient is a nonsmoker, nondrinker.  She denies drug use.    She is .    FAMILY HISTORY:  Positive for heart disease and COPD in her father as well as   non-Hodgkin's lymphoma and Alzheimer's dementia in her mother.    REVIEW OF SYSTEMS:  She denies any fevers or chills.  HEENT:  No headaches, no visual changes.  CARDIOVASCULAR:  Without chest pain, palpitation.  LUNGS:  Without dyspnea.  Currently, she does have a residual cough and mild   sputum production.  GASTROINTESTINAL:  No nausea, vomiting, abdominal pain as per HPI.  GENITOURINARY:  No dysuria, hematuria.  MUSCULOSKELETAL:  Without back pain or joint pain.  NEUROLOGIC:  Without focal weakness, numbness or tingling.  SKIN:  Without changes.  There is no bruising reported.    PHYSICAL EXAMINATION:  VITAL SIGNS:  The patient is found to have temperature 36.2, pulse 74,   respiration 18, blood pressure 117/89, the patient is saturating in the low   90s on low-flow oxygen.  GENERAL:  The patient is a pleasant  female in no acute distress, no   acute respiratory distress.  Well developed, well nourished.  HEENT:  Normocephalic, atraumatic.  EOMI.  PERRLA.  Mucous membranes moist.    Nasopharynx otherwise clear.  NECK:  Stiff range of motion.  No lymphadenopathy or thyromegaly.  CHEST:  Normal bony structures.  LUNGS:  Clear to auscultation anteriorly.  HEART:  Regular rate.  S1 and S2 are normal.  No S3, S4.  ABDOMEN:  Somewhat protuberant.  There is tenderness to the right flank and   abdomen without rebound.  There is some guarding.  GENITOURINARY:  Normal external female genitalia.  RECTAL:  Deferred.  MUSCULOSKELETAL:  No clubbing, cyanosis or edema.  NEUROLOGIC:  The patient is alert and oriented x4.  Cranial nerves II-XII  are   grossly intact.  No sensory loss is elicited.    LABORATORY DATA AND IMAGING:  CBC essentially normal, platelet count is   slightly low at 127.  Her chemistry normal except for glucose of 104.  Her   coagulation is normal.  A TEG with platelet inhibition is normal.  Urinalysis   is normal.  CT abdomen was performed showing ill-defined soft tissue density   in the right lower quadrant retroperitoneal space, likely representing   hematoma versus mass.  There is some curvilinear increased density centrally   within the structure, likely representing a vascular event with blush, active   extravasation and mass with calcification centrally is also possibility, 2   right middle lobe pulmonary nodules measuring up to 5 mm are nonspecific and   may be postinflammatory or postinfectious.  A CT angiogram later did not find   an extravasation or bleeding vessel.    ASSESSMENT AND PLAN:  1.  Likely spontaneous retroperitoneal hemorrhage, status post bronchitis with   aggressive coughing spells.  The patient at this time is referred to surgery,   but appears not to be a surgical candidate.  She will be monitored closely.    There was no active extravasation seen on angiogram.  Patient will be admitted   for pain control and observation and evaluated for increased bleeding and   more blood loss at this time.  2.  Severe abdominal pain secondary to the above, will be on pain management.  3.  History of asthma.  4.  History of bronchitis with recent upper respiratory infection, currently   improving.  5.  Mild thrombocytopenia.  6.  Mild hyperglycemia.    OVERALL PLAN:  The patient is admitted with severe abdominal pain, found to   have a likely spontaneous retroperitoneal hemorrhage from severe coughing and   dry heaving.  She will be admitted to be further pain managed and observed.    The patient in light of her significant pain and possible acute increasing   blood loss to be admitted for aggressive medical care, likely  requiring a 2+   overnight stay in the hospital.  For full further details, please refer to the   computer system and paper chart.       ____________________________________     MD DILLON CARDOSO / FABIOLA    DD:  01/27/2019 17:46:20  DT:  01/27/2019 18:22:05    D#:  [id number]  Job#:  [id number]

## 2019-01-28 NOTE — CARE PLAN
Problem: Safety  Goal: Will remain free from falls  Outcome: PROGRESSING AS EXPECTED    Intervention: Assess risk factors for falls   01/28/19 0118   OTHER   Fall Risk Risk to Fall - 0 - 1 point   Risk for Injury-Any positive answers results in the pt being at high risk for fall related injury Not Applicable   Mobility Status Assessment 0-Ambulates & Transfers Independently. No Assistance Required   History of fall 0   Pt Calls for Assistance Yes;No assistance required     Intervention: Implement fall precautions   01/27/19 2000 01/28/19 0118   OTHER   Environmental Precautions Treaded Slipper Socks on Patient;Personal Belongings, Wastebasket, Call Bell etc. in Easy Reach;Report Given to Other Health Care Providers Regarding Fall Risk;Bed in Low Position;Communication Sign for Patients & Families;Mobility Assessed & Appropriate Sign Placed --    IV Pole on Same Side of Bed as Bathroom --  Yes   Bedrails --  Bedrails Closest to Bathroom Down         Problem: Pain Management  Goal: Pain level will decrease to patient's comfort goal  Outcome: PROGRESSING AS EXPECTED  Pt's back pain decreased significantly after she was able to sit up and ambulate to the bathroom, post-angiography procedure.

## 2019-01-28 NOTE — PROGRESS NOTES
[facility] Medicine Daily Progress Note    Date of Service  1/28/2019    Chief Complaint  63 y.o. female admitted 1/27/2019 with right sided abdominal pain    Hospital Course    Patient is a pleasant 63 year old woman with history of asthma and recent cough (that has now resolved) who presented to the ER with right sided abdominal pain  And was found to have evidence of a right retroperitoneal hematoma or possible mass.  She underwent a angiogram and no active bleed was noted      Interval Problem Update  Patient states she is feeling much better, right sided abdominal pain down from 9 to 4/10, she denies dizziness, no lightheadedness, no cp, no sob  axox4  Ros otherwise negative    Consultants/Specialty  IR    Code Status  Full code    Disposition  tbd    Review of Systems  Review of Systems   Constitutional: Negative.  Negative for chills, diaphoresis, fever, malaise/fatigue and weight loss.   HENT: Negative.  Negative for sore throat.    Eyes: Negative.  Negative for blurred vision.   Respiratory: Negative.  Negative for cough and shortness of breath.    Cardiovascular: Negative.  Negative for chest pain, palpitations and leg swelling.   Gastrointestinal: Positive for abdominal pain. Negative for nausea and vomiting.   Genitourinary: Negative.  Negative for dysuria.   Musculoskeletal: Negative.  Negative for myalgias.   Skin: Negative.  Negative for itching and rash.   Neurological: Negative.  Negative for dizziness, focal weakness, weakness and headaches.   Endo/Heme/Allergies: Negative.  Does not bruise/bleed easily.   Psychiatric/Behavioral: Negative.  Negative for depression, substance abuse and suicidal ideas.   All other systems reviewed and are negative.       Physical Exam  Temp:  [36.3 °C (97.4 °F)-37.2 °C (98.9 °F)] 36.7 °C (98.1 °F)  Pulse:  [65-85] 69  Resp:  [13-17] 16  BP: ()/(56-68) 92/56  SpO2:  [87 %-99 %] 94 %    Physical Exam   Constitutional: She is oriented to person, place, and time. She  appears well-developed and well-nourished. No distress.   HENT:   Head: Normocephalic and atraumatic.   Mouth/Throat: Oropharynx is clear and moist.   Eyes: Conjunctivae are normal.   Neck: Normal range of motion. Neck supple.   Cardiovascular: Normal rate, normal heart sounds and intact distal pulses.  Exam reveals no gallop and no friction rub.    No murmur heard.  Pulmonary/Chest: Effort normal and breath sounds normal. No respiratory distress. She has no wheezes. She has no rales. She exhibits no tenderness.   Abdominal: Soft. Bowel sounds are normal. She exhibits no distension and no mass. There is tenderness (r lq). There is no rebound and no guarding.   Musculoskeletal: Normal range of motion. She exhibits no edema or tenderness.   Neurological: She is alert and oriented to person, place, and time. She has normal reflexes. No cranial nerve deficit. She exhibits normal muscle tone. Coordination normal.   Skin: Skin is warm and dry. No rash noted. She is not diaphoretic. No erythema. No pallor.   Psychiatric: She has a normal mood and affect. Her behavior is normal. Judgment and thought content normal.   Nursing note and vitals reviewed.      Fluids    Intake/Output Summary (Last 24 hours) at 01/28/19 1147  Last data filed at 01/28/19 0400   Gross per 24 hour   Intake          1261.67 ml   Output              300 ml   Net           961.67 ml       Laboratory  Recent Labs      01/27/19   0858  01/28/19   0954   WBC  9.5  9.2   RBC  4.60  3.75*   HEMOGLOBIN  14.5  11.8*   HEMATOCRIT  43.6  36.0*   MCV  94.8  96.0   MCH  31.5  31.5   MCHC  33.3*  32.8*   RDW  49.5  50.4*   PLATELETCT  127*  110*   MPV  12.4  12.2     Recent Labs      01/27/19   0858  01/28/19   0333   SODIUM  138  138   POTASSIUM  4.1  3.9   CHLORIDE  106  106   CO2  27  25   GLUCOSE  104*  103*   BUN  22  18   CREATININE  0.63  0.60   CALCIUM  9.2  8.3*     Recent Labs      01/27/19   1310   APTT  28.6   INR  1.06                Imaging  IR-VISCERAL ANGIOGRAM - SELECTIVE   Final Result      1.  Angiography to evaluate for source of right-sided retroperitoneal hemorrhage as described above with no evidence of active extravasation or any arterial abnormality indicative of a bleeding source.                  INTERPRETING LOCATION: [address], [place], [address]      CT-ABDOMEN-PELVIS WITH   Final Result      Ill-defined soft tissue density in the right lower quadrant retroperitoneal space likely representing hematoma versus mass. There is some curvilinear increased density centrally within the structure likely representing a vessel with blush/active    extravasation. A mass with calcification centrally is also a possibility.      Two right middle lobe pulmonary nodules measuring up to 5 mm are nonspecific and may be postinflammatory or postinfectious though potentially could represent metastatic disease if this truly is a retroperitoneal mass in the right lower quadrant.      This was discussed with Dr. Valdivia at 12:40 PM.           Assessment/Plan  Asthma   Assessment & Plan    No acute exacerbation  following     Acute blood loss anemia   Assessment & Plan    See above  following     Retroperitoneal bleed   Assessment & Plan    No active bleed noted on agio  Cause may have been small bleed due to increased intra abdominal pressure from coughing  H/h significantly decreased overnight  Clinically improving  Continue to monitor          VTE prophylaxis: scd

## 2019-01-28 NOTE — CARE PLAN
Problem: Discharge Barriers/Planning  Goal: Patient’s continuum of care needs will be met  Outcome: PROGRESSING AS EXPECTED  Draw in hgb. MD aware. To keep overnight and recheck in am.    Problem: Fluid Volume:  Goal: Will maintain balanced intake and output  Outcome: PROGRESSING AS EXPECTED  + PO intake. + UOP. IVF stopped for adequate intake.

## 2019-01-28 NOTE — ED NOTES
1700 pt to room from IR. Pt site check performed with IR rn. Pt laying flat and aware need to lay flat for 4 hours.  also aware. Vss. Pt denies complaint

## 2019-01-28 NOTE — PROGRESS NOTES
"Pt A&O x4.    Vitals: /68   Pulse 65   Temp 36.3 °C (97.4 °F) (Temporal)   Resp 16   Ht 1.626 m (5' 4\")   Wt 88 kg (194 lb 0.1 oz)   LMP 01/01/1990   SpO2 97%   Breastfeeding? No   BMI 33.30 kg/m²     Pt rates pain 9 out of 10 in back from laying flat on back post-angiography procedure. Medicated for pain.    Neuro: NGO. Denies new onset of numbness/ tingling.    Cardiac: Denies new onset of chest pain.    Vascular: Pulses 2+ BUE, BLE. No edema noted.    Respiratory: Lungs sound diminished in bases. On 1L O2 NC.  on, satting in 90's. Denies SOB. Cough strong, deep. Pt requesting PRN albuterol and scheduled Symbicort.     GI: Abdomen obese. BM PTA. On regular diet, tolerated <25% of dinner. - nausea/ vomiting.    : Pt voiding adequately.      MSK: Pt up to bathroom SBA, tolerated poorly/ slowly, steady on feet, shuffle, using rails for assistance. Baseline is up self.     Integumentary: RLE groin incision dressing CDI, observed, no drainage noted.    Labs noted.    Fall precautions in place: Bed locked in lowest position, Upper bed rails up, treaded socks in place, personal belongings within reach, call light within reach, appropriate mobility signs in place, - bed alarm. Pt calls appropriately.     Pt updated on POC.   "

## 2019-01-28 NOTE — OR SURGEON
Immediate Post- Operative Note        PostOp Diagnosis: SPONTANEOUS RIGHT RETROPERITONEAL HEMORRHAGE. SMALL BLUSH ON CT. POSSIBLE ACTIVE BLEEDING. NO ACTIVE BLEED SEEN ON CATHETER ANGIOGRAM.       Procedure(s): R CFA PUNCTURE. RIGHT INTERCOSTAL AND LUMBAR ARTERY ANGIOGRAMS T12 - L4. R INTERNAL ILIAC ANGIO, R INFERIOR EPIGASTRIC ANGIO, SMA ANGIO, ABD AORTAGRAM, AO-ILIAC PELVIC ANGIOGRAM.    NO PSEUDOANEURSYM, ACTIVE BLEED, ARTERIAL VASCULAR INJURY, AV SHUNTING, OR ABNORMAL BLUSH.    NO EMBOLIZATION INDICATED OR PERFORMED.       Estimated Blood Loss: Less than 5 ml        Complications: None            1/27/2019     4:31 PM     Derek Sullivan

## 2019-01-28 NOTE — ASSESSMENT & PLAN NOTE
No active bleed noted on agio  Cause may have been small bleed due to increased intra abdominal pressure from coughing  H/h significantly decreased overnight  Clinically improving  Continue to monitor

## 2019-01-28 NOTE — PROGRESS NOTES
2RN skin check completed with KATHARINE Ramires.   Skin integrityis PWD and intact.   Surgical incision present on RLgroin, dressed with dry gauze and tegaderm. No drainage observed.

## 2019-01-28 NOTE — PROGRESS NOTES
IR Procedure Note:    Dr. Sullivan consented patient prior to procedure; all questions answered.    Site confirmed with ultrasound imaging by patient, physician, RT, and RN.     Dr. Sullivan completed a visceral angiogram.  The patient tolerated the procedure well; ETCo2 baseline 33, with consistent waveform during the procedure.      Tegaderm and gauze applied to right groin, CDI and soft; pressure held x 15 minutes.  Patient alert, oriented and verbally appropriate post procedure, vital signs stable during procedure and transport, see flow sheet for vital signs.  Report given to KATHARINE Vergara.  RN transported patient to [facility] with Sao2 monitor = 97 % on oxygen via NC @ 2 lpm.     Sedation End Time: 1640

## 2019-01-29 ENCOUNTER — PATIENT OUTREACH (OUTPATIENT)
Dept: HEALTH INFORMATION MANAGEMENT | Facility: OTHER | Age: 64
End: 2019-01-29

## 2019-01-29 VITALS
BODY MASS INDEX: 33.12 KG/M2 | SYSTOLIC BLOOD PRESSURE: 123 MMHG | OXYGEN SATURATION: 95 % | RESPIRATION RATE: 18 BRPM | DIASTOLIC BLOOD PRESSURE: 66 MMHG | TEMPERATURE: 98 F | HEIGHT: 64 IN | HEART RATE: 88 BPM | WEIGHT: 194 LBS

## 2019-01-29 LAB
BASOPHILS # BLD AUTO: 0.7 % (ref 0–1.8)
BASOPHILS # BLD: 0.05 K/UL (ref 0–0.12)
EOSINOPHIL # BLD AUTO: 0.35 K/UL (ref 0–0.51)
EOSINOPHIL NFR BLD: 4.6 % (ref 0–6.9)
ERYTHROCYTE [DISTWIDTH] IN BLOOD BY AUTOMATED COUNT: 49.3 FL (ref 35.9–50)
HCT VFR BLD AUTO: 34.6 % (ref 37–47)
HGB BLD-MCNC: 11.3 G/DL (ref 12–16)
IMM GRANULOCYTES # BLD AUTO: 0.02 K/UL (ref 0–0.11)
IMM GRANULOCYTES NFR BLD AUTO: 0.3 % (ref 0–0.9)
LYMPHOCYTES # BLD AUTO: 2.34 K/UL (ref 1–4.8)
LYMPHOCYTES NFR BLD: 30.5 % (ref 22–41)
MCH RBC QN AUTO: 31.5 PG (ref 27–33)
MCHC RBC AUTO-ENTMCNC: 32.7 G/DL (ref 33.6–35)
MCV RBC AUTO: 96.4 FL (ref 81.4–97.8)
MONOCYTES # BLD AUTO: 0.64 K/UL (ref 0–0.85)
MONOCYTES NFR BLD AUTO: 8.4 % (ref 0–13.4)
NEUTROPHILS # BLD AUTO: 4.26 K/UL (ref 2–7.15)
NEUTROPHILS NFR BLD: 55.5 % (ref 44–72)
NRBC # BLD AUTO: 0 K/UL
NRBC BLD-RTO: 0 /100 WBC
PLATELET # BLD AUTO: 98 K/UL (ref 164–446)
PMV BLD AUTO: 12.3 FL (ref 9–12.9)
RBC # BLD AUTO: 3.59 M/UL (ref 4.2–5.4)
WBC # BLD AUTO: 7.7 K/UL (ref 4.8–10.8)

## 2019-01-29 PROCEDURE — 36415 COLL VENOUS BLD VENIPUNCTURE: CPT

## 2019-01-29 PROCEDURE — 85025 COMPLETE CBC W/AUTO DIFF WBC: CPT

## 2019-01-29 PROCEDURE — A9270 NON-COVERED ITEM OR SERVICE: HCPCS | Performed by: HOSPITALIST

## 2019-01-29 PROCEDURE — 700102 HCHG RX REV CODE 250 W/ 637 OVERRIDE(OP): Performed by: HOSPITALIST

## 2019-01-29 PROCEDURE — 99239 HOSP IP/OBS DSCHRG MGMT >30: CPT | Performed by: INTERNAL MEDICINE

## 2019-01-29 RX ORDER — OXYCODONE HYDROCHLORIDE AND ACETAMINOPHEN 5; 325 MG/1; MG/1
1-2 TABLET ORAL EVERY 4 HOURS PRN
Qty: 12 TAB | Refills: 0 | Status: SHIPPED | OUTPATIENT
Start: 2019-01-29 | End: 2019-02-01

## 2019-01-29 RX ORDER — POLYETHYLENE GLYCOL 3350 17 G/17G
17 POWDER, FOR SOLUTION ORAL
Refills: 3 | COMMUNITY
Start: 2019-01-29 | End: 2021-08-31

## 2019-01-29 RX ORDER — AMOXICILLIN 250 MG
2 CAPSULE ORAL 2 TIMES DAILY
Qty: 30 TAB | Refills: 0 | Status: SHIPPED | OUTPATIENT
Start: 2019-01-29 | End: 2021-08-31

## 2019-01-29 RX ORDER — ACETAMINOPHEN 325 MG/1
650 TABLET ORAL EVERY 6 HOURS PRN
Qty: 30 TAB | Refills: 0 | Status: SHIPPED | OUTPATIENT
Start: 2019-01-29

## 2019-01-29 RX ORDER — GUAIFENESIN/DEXTROMETHORPHAN 100-10MG/5
10 SYRUP ORAL EVERY 6 HOURS PRN
Qty: 840 ML | COMMUNITY
Start: 2019-01-29 | End: 2021-08-31

## 2019-01-29 RX ADMIN — OXYCODONE HYDROCHLORIDE 2.5 MG: 5 TABLET ORAL at 00:01

## 2019-01-29 RX ADMIN — SENNOSIDES AND DOCUSATE SODIUM 2 TABLET: 8.6; 5 TABLET ORAL at 05:31

## 2019-01-29 RX ADMIN — ACETAMINOPHEN 650 MG: 325 TABLET, FILM COATED ORAL at 05:31

## 2019-01-29 RX ADMIN — BUDESONIDE AND FORMOTEROL FUMARATE DIHYDRATE 2 PUFF: 160; 4.5 AEROSOL RESPIRATORY (INHALATION) at 07:49

## 2019-01-29 NOTE — PROGRESS NOTES
Report received, poc discussed, assumed care of pt.   Call light in reach, hourly rounding in place.   Pt gets up self.   Reg diet.  + void. LBM 1/28.   Oxy for pain.  No further needs.   at bedside.

## 2019-01-29 NOTE — CARE PLAN
Problem: Discharge Barriers/Planning  Goal: Patient’s continuum of care needs will be met  Outcome: PROGRESSING AS EXPECTED  MD to round and evaluate if pt in stable condition to DC.    Problem: Fluid Volume:  Goal: Will maintain balanced intake and output  Outcome: PROGRESSING AS EXPECTED  + PO intake. + UOP.

## 2019-01-29 NOTE — CARE PLAN
Problem: Safety  Goal: Will remain free from falls  Outcome: PROGRESSING AS EXPECTED    Intervention: Assess risk factors for falls   01/29/19 0055   OTHER   Fall Risk Risk to Fall - 0 - 1 point   Risk for Injury-Any positive answers results in the pt being at high risk for fall related injury Not Applicable   Mobility Status Assessment 0-Ambulates & Transfers Independently. No Assistance Required   History of fall 0   Pt Calls for Assistance No assistance required     Intervention: Implement fall precautions   01/28/19 2000 01/29/19 0055   OTHER   Environmental Precautions Treaded Slipper Socks on Patient;Personal Belongings, Wastebasket, Call Bell etc. in Easy Reach;Report Given to Other Health Care Providers Regarding Fall Risk;Bed in Low Position;Communication Sign for Patients & Families;Mobility Assessed & Appropriate Sign Placed --    Bedrails --  Bedrails Closest to Bathroom Down         Problem: Venous Thromboembolism (VTW)/Deep Vein Thrombosis (DVT) Prevention:  Goal: Patient will participate in Venous Thrombosis (VTE)/Deep Vein Thrombosis (DVT)Prevention Measures  Outcome: PROGRESSING AS EXPECTED    Intervention: Encourage ambulation/mobilization at level directed by Physical Therapy in collaboration with Interdisciplinary Team  Pt ambulating up to bathroom and halls periodically throughout the day.       Problem: Bowel/Gastric:  Goal: Normal bowel function is maintained or improved  Outcome: PROGRESSING AS EXPECTED   01/28/19 2000   OTHER   Last BM 01/28/19   Number of Times Stooled 1

## 2019-01-29 NOTE — DISCHARGE SUMMARY
"Discharge Summary    CHIEF COMPLAINT ON ADMISSION  Chief Complaint   Patient presents with   • RUQ Pain   • RLQ Pain       Reason for Admission  Right Flank Pain     Admission Date  1/27/2019    CODE STATUS  Full Code    HPI & HOSPITAL COURSE  This is a 63 y.o. female here with RUQ Pain and RLQ Pain     Please review Dr. Colby Hernandez M.D. notes for further details of history of present illness, past medical/social/family histories, allergies and medications.     History of asthma.  She was recently treated for asthma exacerbation. She completed course of steroids and PO antibiotics along with inhalers. During that bout, she had excessive coughing which has improved. Apparently she developed R groin/flank pain and came in for evaluation.   At the ED, she is afebrile and hemodynamically stable. CT scan showed the following:  \"Ill-defined soft tissue density in the right lower quadrant retroperitoneal space likely representing hematoma versus mass. There is some curvilinear increased density centrally within the structure likely representing a vessel with blush/active   extravasation. A mass with calcification centrally is also a possibility.  Two right middle lobe pulmonary nodules measuring up to 5 mm are nonspecific and may be postinflammatory or postinfectious though potentially could represent metastatic disease if this truly is a retroperitoneal mass in the right lower quadrant.\"  Her subsequent hemogram showed mild anemia but a Hb drop nontheless.  Dr. Sullivan, IR perforemd angiogram which showed:  1.  Angiography to evaluate for source of right-sided retroperitoneal hemorrhage as described above with no evidence of active extravasation or any arterial abnormality indicative of a bleeding source.  I spoke with Dr. Sullivan. He is convinced this is a retroperitoneal bleed likely from excessive coughing that is now resolving. There is no source of bleed to intervene. There is also no mass to biopsy. He recommended " however to have follow-up CT scan in at least 3 months to monitor. Meanwhile upon inquiry, while she has a family history of lymphoma (her mom), she denies any weight loss, sweats, fevers and no history of smoking or chemical/radiation exposure. I have instructed her to follow up with primary care to arrange outpatinet CT scans. Meanwhile, she will be discharged on pain medications and bowel regimen. AVOID NSAIDs, aspirin or blood thinners. She and her  desired to be discharged today.    She will continue her inhalers for asthma. Currently she is not exacerbating. Her Hb remained stable and anemia in the mild category. She will follow up with primary provider for that as well.    At discharge date, Kaylan Beverly afebrile and hemodynamically stable.  Kaylan Beverly wanted to be discharged today.    Discharge Physical Exam  General/Constitutional: No acute distress.   Head: Normocephalic, atraumatic  ENT: Oral mucosa is moist. No obvious pharyngeal exudates  Eyes: Pink conjunctiva, no scleral icterus  Neck: Supple, no lymphadenopathy  Cardiovascular: Normal rate and regular rhythm. S1,2 noted. No murmurs, gallops or rubs.  Pulmonary: Clear to auscultation bilaterally. No wheezes, rales or rhonchi.  Abdominal: Soft, nontender, not distended, bowel sounds normoactive. No guarding or peritoneal signs.  Musculoskeletal: No tenderness to palpation of chest wall.  Neurologic: Alert and oriented. Grossly nonfocal, moving all extremities.  Genitourinary: No gross hematuria. Mild groin/flank tenderness  Skin: No obvious rash.  Psychiatric: Pleasant, cooperative.  Vitals Reviewed  Labs Reviewed  Imaging reviewed  Nursing notes reviewed      Imaging  IR-VISCERAL ANGIOGRAM - SELECTIVE   Final Result      1.  Angiography to evaluate for source of right-sided retroperitoneal hemorrhage as described above with no evidence of active extravasation or any arterial abnormality indicative of a bleeding source.                   INTERPRETING LOCATION: [address] [facility] [place] [facility] [place], [address]      CT-ABDOMEN-PELVIS WITH   Final Result      Ill-defined soft tissue density in the right lower quadrant retroperitoneal space likely representing hematoma versus mass. There is some curvilinear increased density centrally within the structure likely representing a vessel with blush/active    extravasation. A mass with calcification centrally is also a possibility.      Two right middle lobe pulmonary nodules measuring up to 5 mm are nonspecific and may be postinflammatory or postinfectious though potentially could represent metastatic disease if this truly is a retroperitoneal mass in the right lower quadrant.      This was discussed with Dr. Valdivia at 12:40 PM.                   Therefore, she is discharged in good and stable condition to home with close outpatient follow-up.    The patient met 2-midnight criteria for an inpatient stay at the time of discharge.    Discharge Date  1/29/19    FOLLOW UP ITEMS POST DISCHARGE  Primary provider for outpt CT and anemia work-up    DISCHARGE DIAGNOSES  Active Problems:    Retroperitoneal bleed POA: POA    Acute blood loss anemia POA: POA    Asthma POA: Unknown      FOLLOW UP  No future appointments.  Linda Rosario, A.P.NSandra  [address] [facility] [address]  [phone]    Schedule an appointment as soon as possible for a visit in 1 week  follow up after hospital stay       MEDICATIONS ON DISCHARGE     Medication List      START taking these medications      Instructions   acetaminophen 325 MG Tabs  Commonly known as:  TYLENOL   Take 2 Tabs by mouth every 6 hours as needed (Mild Pain; (Pain scale 1-3); Temp greater than 100.5 F).  Dose:  650 mg     guaiFENesin dextromethorphan 100-10 MG/5ML Syrp syrup  Commonly known as:  ROBITUSSIN DM   Take 10 mL by mouth every 6 hours as needed for Cough.  Dose:  10 mL     oxyCODONE-acetaminophen 5-325 MG Tabs  Commonly known as:  PERCOCET   Doctor's comments:  For retroperitoneal  pain  Take 1-2 Tabs by mouth every four hours as needed for up to 3 days.  Dose:  1-2 Tab     polyethylene glycol/lytes Pack  Commonly known as:  MIRALAX   Take 1 Packet by mouth 1 time daily as needed (if sennosides and docusate ineffective after 24 hours).  Dose:  17 g     senna-docusate 8.6-50 MG Tabs  Commonly known as:  PERICOLACE or SENOKOT S   Doctor's comments:  Hold if diarrhea  Take 2 Tabs by mouth 2 Times a Day.  Dose:  2 Tab        CONTINUE taking these medications      Instructions   albuterol 108 (90 Base) MCG/ACT Aers inhalation aerosol   Inhale 2 Puffs by mouth every 6 hours as needed for Shortness of Breath.  Dose:  2 Puff     fluticasone-salmeterol 250-50 MCG/DOSE Aepb  Commonly known as:  ADVAIR   Inhale 1 Puff by mouth every 12 hours.  Dose:  1 Puff        In prescribing controlled substances to this patient, I certify that I have obtained and reviewed the medical history of Kaylan Beverly. I have also made a good aki effort to obtain applicable records from other providers who have treated the patient and no other records are available at this time.     I have conducted a physical exam and documented it. I have reviewed Ms. Beverly’s prescription history as maintained by the [place] Prescription Monitoring Program.     I have assessed the patient’s risk for abuse, dependency, and addiction using the validated Opioid Risk Tool available at https://www.mdcalc.com/apfjix-uyfx-zqxi-ort-narcotic-abuse.     Given the above, I believe the benefits of controlled substance therapy outweigh the risks. The reasons for prescribing controlled substances include non-narcotic, oral analgesic alternatives have been inadequate for pain control. Accordingly, I have discussed the risk and benefits, treatment plan, and alternative therapies with the patient.       Allergies  Allergies   Allergen Reactions   • Asa [Aspirin] Hives       DIET  Orders Placed This Encounter   Procedures   • Diet Order Regular      Standing Status:   Standing     Number of Occurrences:   1     Order Specific Question:   Diet:     Answer:   Regular [1]       ACTIVITY  No heavy lifting  No strenuous activity    CONSULTATIONS      PROCEDURES  Ct-abdomen-pelvis With    Result Date: 1/27/2019 1/27/2019 10:58 AM HISTORY/REASON FOR EXAM:  Abd pain, unspecified; hx of appendectomy in 60s; RLQ Pain TECHNIQUE/EXAM DESCRIPTION: CT scan of the abdomen and pelvis with contrast. Contrast-enhanced helical scanning was obtained from the diaphragmatic domes through the pubic symphysis following the bolus administration of 100 mL of Omnipaque 350 nonionic contrast without complication. Low dose optimization technique was utilized for this CT exam including automated exposure control and adjustment of the mA and/or kV according to patient size. COMPARISON: No prior studies available. FINDINGS: There are 2 pulmonary nodules in the right middle lobe measuring up to 5 mm. There is bibasilar atelectasis. CT Abdomen: There is diffuse low-attenuation in the liver consistent with fatty infiltration. No focal hepatic lesions identified. The spleen is unremarkable. The pancreas is unremarkable. The gallbladder demonstrates no radiopaque stones. The adrenal glands are not enlarged and the kidneys enhance symmetrically. There is no lymphadenopathy. The bowel is nondilated. The cecum is mobile located in the left mid abdomen. In the right lower quadrant which appears located retroperitoneal there is a heterogeneous soft tissue mass measuring 10.1 x 6.8 x 11.3 cm. There is some linear increased density within the soft tissue density/mass which could represent a vessel with contrast extravasation or calcification. The soft tissues structure has a mean Hounsfield measurement of 60 which could represent hematoma. There is stranding in the retroperitoneum extending into the pelvis. The fluid in the pelvis is somewhat high in attenuation suggesting hemorrhage. CT Pelvis: There  is no lymphadenopathy. There is no acute inflammatory process. There is postsurgical change in the lumbar spine.     Ill-defined soft tissue density in the right lower quadrant retroperitoneal space likely representing hematoma versus mass. There is some curvilinear increased density centrally within the structure likely representing a vessel with blush/active extravasation. A mass with calcification centrally is also a possibility. Two right middle lobe pulmonary nodules measuring up to 5 mm are nonspecific and may be postinflammatory or postinfectious though potentially could represent metastatic disease if this truly is a retroperitoneal mass in the right lower quadrant. This was discussed with Dr. Valdivia at 12:40 PM.    Ir-visceral Angiogram - Selective    Result Date: 1/27/2019 1/27/2019 3:27 PM HISTORY/REASON FOR EXAM:  Spontaneous right-sided retroperitoneal hemorrhage. Small focal blush of contrast seen on CT of the abdomen and pelvis. Possible active bleeding. TECHNIQUE/EXAM DESCRIPTION: 1.  Ultrasound guidance for vascular puncture right common femoral artery 2.  Selective intercostal and lumbar artery angiography T12, L1, L2, L3, L4 3.  Selective right internal iliac angiography 4.  Selective right inferior epigastric angiography 5.  Superior mesenteric artery angiography 6.  Abdominal aortogram 7.  Aortoiliac angiography (pelvic angiography) PROCEDURE: Moderate sedation with Fentanyl and Versed was administered during the procedure with appropriate continuous patient monitoring by the radiology nurse. SEDATION DURATION: 45 minutes. CURRENT ALARA PRINCIPLES FOR DOSE REDUCTION TECHNIQUES WERE UTILIZED FOR THIS EXAMINATION. FLUOROSCOPY TIME: 9.7 minutes NUMBER OF FILMS: 32 fluoroscopic frame capture images and/or digital series obtained. CONTRAST AMOUNT: 172 mL Omnipaque CT of the abdomen and pelvis 1/27/2019 was reviewed. Informed consent was obtained. The right groin was prepped and draped in a sterile  manner.  A patent right common femoral artery was identified with real-time ultrasound and an image recorded and entered into the patient's medical record.  Following local anesthesia with 6 cc 1% lidocaine, the right common femoral artery was punctured under real-time ultrasound guidance and access obtained with a single wall micropuncture technique. A 5 Austrian vascular access sheath was placed. A 5 Austrian spinal catheter was then advanced into the abdominal aorta. Selective catheterization was performed of the RIGHT T12 intercostal artery, RIGHT lumbar arteries L1, L2, L3, L4. DSA angiograms were performed in the AP projection. Next, the superior mesenteric artery was selectively catheterized. DSA angiography was performed in the AP projection filming over the right midabdomen and right lower quadrant. Next, the right internal iliac artery was selectively catheterized. DSA angiography was performed in AP projection. Next, the catheter was exchanged for a 4 Austrian pigtail catheter. Abdominal aortogram was performed in the AP projection with the catheter positioned in the proximal infrarenal abdominal aorta. Next, the catheter was repositioned into the distal infrarenal abdominal aorta. Aorto iliac angiography was performed with filming over the pelvis and lower abdomen in the AP projection with DSA technique. Next, the spinal catheter was advanced into the right common iliac and then external iliac artery. The right inferior epigastric artery was selectively catheterized. Hand injected DSA angiography was performed in the AP projection. The spinal catheter was then removed over an angled Glidewire. The above-described angiograms show no evidence of active extravasation, pseudoaneurysm, arteriovenous malformation or abnormal AV shunting, venous phase contrast blush, or evidence of vascular injury. Therefore, no embolization procedure was indicated were performed. The vascular access sheath was removed and following  manual compression at the right groin for 15 minutes, satisfactory hemostasis was obtained. A sterile dressing was applied. The patient tolerated the procedure well with no evidence of complication. COMPARISON:  CT of the abdomen and pelvis with contrast 1/27/2019 FINDINGS:  The above-described angiograms show no evidence of active extravasation, pseudoaneurysm, arteriovenous malformation, or abnormal AV shunting, venous phase contrast blush, or any evidence of vascular injury. Incidentally noted lumbar posterior fusion hardware.     1.  Angiography to evaluate for source of right-sided retroperitoneal hemorrhage as described above with no evidence of active extravasation or any arterial abnormality indicative of a bleeding source. INTERPRETING LOCATION: [address] [place] [address], [address]      LABORATORY  Lab Results   Component Value Date    SODIUM 138 01/28/2019    POTASSIUM 3.9 01/28/2019    CHLORIDE 106 01/28/2019    CO2 25 01/28/2019    GLUCOSE 103 (H) 01/28/2019    BUN 18 01/28/2019    CREATININE 0.60 01/28/2019        Lab Results   Component Value Date    WBC 7.7 01/29/2019    HEMOGLOBIN 11.3 (L) 01/29/2019    HEMATOCRIT 34.6 (L) 01/29/2019    PLATELETCT 98 (L) 01/29/2019        Total time of the discharge process exceeds 35 minutes.

## 2019-01-29 NOTE — PROGRESS NOTES
"Pt A&O x4.  at bedside.     Vitals: /72   Pulse 81   Temp 36.3 °C (97.4 °F) (Temporal)   Resp 18   Ht 1.626 m (5' 4\")   Wt 88 kg (194 lb 0.1 oz)   LMP 01/01/1990   SpO2 95%   Breastfeeding? No   BMI 33.30 kg/m²     Pt states pain is tolerable, will ambulate one more time before bed.    Neuro: NGO. Denies new onset of numbness/ tingling.    Cardiac: Denies new onset of chest pain.    Vascular: Pulses 2+ BUE, BLE. No edema noted.    Respiratory: Lungs sound diminished in bases. On RA.  on, satting in 90's. Denies SOB. Cough strong, congested, productive. Pulling 2250 on IS, effective, strong effort.     GI: Abdomen obese. + BM today. On regular diet, tolerating well. - nausea/ vomiting.    : Pt voiding adequately.      MSK: Pt up to bathroom self, no assistance required, tolerating well. Ambulating halls today with .      Integumentary: RLE groin incision dressing CDI, observed, no drainage noted.    Labs noted. Monitoring H&H.    Fall precautions in place: Bed locked in lowest position, Upper bed rails up, treaded socks in place, personal belongings within reach, call light within reach, appropriate mobility signs in place, - bed alarm. Pt calls appropriately.     Pt updated on POC.   "

## 2019-01-29 NOTE — DISCHARGE INSTRUCTIONS
Discharge Instructions    Discharged to home by car with relative. Discharged via wheelchair, hospital escort: Yes.  Special equipment needed: Not Applicable    Be sure to schedule a follow-up appointment with your primary care doctor or any specialists as instructed.     Discharge Plan:   Diet Plan: Discussed  Activity Level: Discussed  Confirmed Follow up Appointment: Patient to Call and Schedule Appointment  Confirmed Symptoms Management: Discussed  Medication Reconciliation Updated: Yes  Influenza Vaccine Indication: Not indicated: Previously immunized this influenza season and > 8 years of age    I understand that a diet low in cholesterol, fat, and sodium is recommended for good health. Unless I have been given specific instructions below for another diet, I accept this instruction as my diet prescription.   Other diet: Regular, as tolerated    Special Instructions:   Follow up with LARA Torres In 1 week   Will need follow-up CT scan in at least 3 months or earlier follow up on retroperitoneal bleed and pulmonary nodules. Currently Radiology feels no area in the retroperitoneum to biopsy     Instruct Kaylan Beverly to return to the ER in the event of worsening symptoms.  the patient on the importance of compliance and the patient has agreed to proceed with all medical recommendations and follow up plan indicated above.    the patient that all medications come with benefits and risks. Risks may include permanent injury or death and these risks can be minimized with close reassessment and monitoring.    · Is patient discharged on Warfarin / Coumadin?   No     Depression / Suicide Risk    As you are discharged from this [facility] facility, it is important to learn how to keep safe from harming yourself.    Recognize the warning signs:  · Abrupt changes in personality, positive or negative- including increase in energy   · Giving away possessions  · Change in eating patterns-  significant weight changes-  positive or negative  · Change in sleeping patterns- unable to sleep or sleeping all the time   · Unwillingness or inability to communicate  · Depression  · Unusual sadness, discouragement and loneliness  · Talk of wanting to die  · Neglect of personal appearance   · Rebelliousness- reckless behavior  · Withdrawal from people/activities they love  · Confusion- inability to concentrate     If you or a loved one observes any of these behaviors or has concerns about self-harm, here's what you can do:  · Talk about it- your feelings and reasons for harming yourself  · Remove any means that you might use to hurt yourself (examples: pills, rope, extension cords, firearm)  · Get professional help from the community (Mental Health, Substance Abuse, psychological counseling)  · Do not be alone:Call your Safe Contact- someone whom you trust who will be there for you.  · Call your local CRISIS HOTLINE 860-5714 or 768-008-9271  · Call your local Children's Mobile Crisis Response Team [place] (170) 471-7501 or www.[employer]  · Call the toll free National Suicide Prevention Hotlines   · National Suicide Prevention Lifeline 052-442-KYOH (9840)  · National Hope Line Network 800-SUICIDE (627-4174)    Retroperitoneal Bleeding  Introduction  Retroperitoneal bleeding happens when the blood vessels behind your abdomen bleed into the space between your abdomen and your back (retroperitoneal space). This space contains:  · Your kidneys.  · The glands that are on top of your kidneys (adrenal glands).  · The tubes that drain urine from your kidneys (ureters).  · The large blood vessel that carries blood to your lower body (aorta).  · Some parts of your digestive tract.  Bleeding may come from any organ in the retroperitoneal space. This is a rare but life-threatening condition.  What are the causes?  This condition may be caused by:  · Surgery in the retroperitoneal space.  · Injury (trauma) to your  pelvic area, abdomen, or back.  When retroperitoneal bleeding is not caused by trauma, it is called spontaneous retroperitoneal bleeding. Spontaneous retroperitoneal bleeding may be caused by:  · Tumors of the kidneys or adrenal glands.  · Use of medicines that thin your blood (anticoagulants).  · Diseases that cause your body to be unable to form blood clots (clotting disorders).  · Swelling in a blood vessel from a weakened artery wall (aneurysm) that can rupture and cause bleeding.  Sometimes, the cause is not known (idiopathic).  What increases the risk?  This condition is more likely to develop in people:  · Who are on dialysis.  · Who take anticoagulant medicine.  · Who have high blood pressure.  · Who have hardening of the arteries (atherosclerosis).  What are the signs or symptoms?  Signs and symptoms may appear suddenly if the bleeding starts suddenly (acute), or they may appear gradually if the bleeding occurs slowly over time (chronic). Symptoms of this condition include:  · Pain in the abdomen or side (flank).  · Pain when pressing on the abdomen or flank.  · Dizziness or light-headedness from low blood pressure.  · Rapid heartbeat (tachycardia).  · Blood in the urine.  · Very low blood pressure, which can cause shock. Symptoms of shock include:  ¨ Fainting.  ¨ Trouble with breathing.  ¨ Cold, clammy skin.  How is this diagnosed?  This condition may be diagnosed based on your symptoms and your medical history. It is important to find the cause of the bleeding. Your health care provider will do a physical exam. You may also have tests, including:  · Abdominal X-rays to check for signs of bleeding or aneurysm.  · Blood tests to check for low blood counts (anemia) or blood loss.  · Imaging studies, such as:  ¨ CT scan. You may have dye injected into a blood vessel to help locate the source of the bleeding (CT angiography).  ¨ Ultrasound.  ¨ MRI.  How is this treated?  The goal of treatment is to remove the  blood and stop the bleeding. Treatment may include:  · Giving you fluids through an IV tube to get your blood pressure back into a normal range.  · Giving you blood from a donor (transfusion).  · Placing a long tube (catheter) through your blood vessel to the site of bleeding and blocking the bleeding with a small plug (embolization).  · Exploratory surgery to find the bleeding and stop it. This may be done using an operating scope (laparoscopy) or as an open surgery (laparotomy).  This information is not intended to replace advice given to you by your health care provider. Make sure you discuss any questions you have with your health care provider.  Document Released: 03/09/2006 Document Revised: 05/25/2017 Document Reviewed: 09/23/2015  © 2017 [employer]vier

## 2019-02-02 NOTE — ADDENDUM NOTE
Encounter addended by: Serenity Bush on: 2/2/2019  6:44 AM<BR>    Actions taken: Charge Capture section accepted

## 2019-02-14 NOTE — ADDENDUM NOTE
Encounter addended by: Colby Hernandez M.D. on: 2/13/2019  7:04 PM<BR>    Actions taken: Charge Capture section accepted

## 2021-09-08 PROBLEM — S46.211A TRAUMATIC PARTIAL TEAR OF RIGHT BICEPS TENDON: Status: ACTIVE | Noted: 2021-09-08
